# Patient Record
Sex: MALE | Race: WHITE | Employment: OTHER | URBAN - METROPOLITAN AREA
[De-identification: names, ages, dates, MRNs, and addresses within clinical notes are randomized per-mention and may not be internally consistent; named-entity substitution may affect disease eponyms.]

---

## 2017-02-15 RX ORDER — CARVEDILOL 25 MG/1
25 TABLET ORAL 2 TIMES DAILY WITH MEALS
Qty: 180 TABLET | Refills: 3 | Status: SHIPPED | OUTPATIENT
Start: 2017-02-15 | End: 2017-12-02 | Stop reason: SDUPTHER

## 2017-02-15 RX ORDER — RAMIPRIL 10 MG/1
10 CAPSULE ORAL DAILY
Qty: 90 CAPSULE | Refills: 3 | Status: SHIPPED | OUTPATIENT
Start: 2017-02-15 | End: 2017-12-02 | Stop reason: SDUPTHER

## 2017-02-15 RX ORDER — CLOPIDOGREL BISULFATE 75 MG/1
75 TABLET ORAL DAILY
Qty: 90 TABLET | Refills: 3 | Status: SHIPPED | OUTPATIENT
Start: 2017-02-15 | End: 2017-12-02 | Stop reason: SDUPTHER

## 2017-02-15 RX ORDER — RANOLAZINE 500 MG/1
500 TABLET, EXTENDED RELEASE ORAL 2 TIMES DAILY
Qty: 180 TABLET | Refills: 3 | Status: SHIPPED | OUTPATIENT
Start: 2017-02-15 | End: 2019-02-19 | Stop reason: SDUPTHER

## 2017-04-18 ENCOUNTER — TELEPHONE (OUTPATIENT)
Dept: CARDIOLOGY CLINIC | Age: 62
End: 2017-04-18

## 2017-11-07 ENCOUNTER — HOSPITAL ENCOUNTER (OUTPATIENT)
Dept: OTHER | Age: 62
Discharge: HOME | End: 2017-11-07
Attending: INTERNAL MEDICINE | Admitting: INTERNAL MEDICINE

## 2017-11-07 DIAGNOSIS — E78.5 HYPERLIPIDEMIA, UNSPECIFIED HYPERLIPIDEMIA TYPE: ICD-10-CM

## 2017-11-07 DIAGNOSIS — I25.10 CORONARY ARTERY DISEASE INVOLVING NATIVE CORONARY ARTERY OF NATIVE HEART WITHOUT ANGINA PECTORIS: ICD-10-CM

## 2017-11-07 DIAGNOSIS — I10 ESSENTIAL HYPERTENSION: ICD-10-CM

## 2017-11-07 DIAGNOSIS — I25.5 CARDIOMYOPATHY, ISCHEMIC: ICD-10-CM

## 2017-11-07 LAB
A/G RATIO: 1.6 (ref 1.1–2.2)
ALBUMIN SERPL-MCNC: 4 G/DL (ref 3.4–5)
ALP BLD-CCNC: 86 U/L (ref 40–129)
ALT SERPL-CCNC: 8 U/L (ref 10–40)
ANION GAP SERPL CALCULATED.3IONS-SCNC: 12 MMOL/L (ref 3–16)
AST SERPL-CCNC: 13 U/L (ref 15–37)
BILIRUB SERPL-MCNC: <0.2 MG/DL (ref 0–1)
BUN BLDV-MCNC: 15 MG/DL (ref 7–20)
CALCIUM SERPL-MCNC: 9.1 MG/DL (ref 8.3–10.6)
CHLORIDE BLD-SCNC: 103 MMOL/L (ref 99–110)
CHOLESTEROL, TOTAL: 151 MG/DL (ref 0–199)
CO2: 25 MMOL/L (ref 21–32)
CREAT SERPL-MCNC: 1 MG/DL (ref 0.8–1.3)
GFR AFRICAN AMERICAN: >60
GFR NON-AFRICAN AMERICAN: >60
GLOBULIN: 2.5 G/DL
GLUCOSE BLD-MCNC: 95 MG/DL (ref 70–99)
HDLC SERPL-MCNC: 39 MG/DL (ref 40–60)
LDL CHOLESTEROL CALCULATED: 93 MG/DL
POTASSIUM SERPL-SCNC: 4.4 MMOL/L (ref 3.5–5.1)
SODIUM BLD-SCNC: 140 MMOL/L (ref 136–145)
TOTAL PROTEIN: 6.5 G/DL (ref 6.4–8.2)
TRIGL SERPL-MCNC: 97 MG/DL (ref 0–150)
VLDLC SERPL CALC-MCNC: 19 MG/DL

## 2017-11-10 ENCOUNTER — TELEPHONE (OUTPATIENT)
Dept: CARDIOLOGY CLINIC | Age: 62
End: 2017-11-10

## 2017-11-28 NOTE — PROGRESS NOTES
Color Doppler Complete    CANCELED: ECHO 2D WO COLOR DOPPLER COMPLETE   2. Essential hypertension  nitroGLYCERIN (NITROSTAT) 0.4 MG SL tablet    CANCELED: ECHO 2D WO COLOR DOPPLER COMPLETE   3. Murmur  nitroGLYCERIN (NITROSTAT) 0.4 MG SL tablet    ECHO 2D WO Color Doppler Complete    CANCELED: ECHO 2D WO COLOR DOPPLER COMPLETE     Plan:  Mr Rose Smith appears stable from a cardiac standpoint. 1. No change in cardiac medical regimen. 2. 2D echo soon, has a murmur. 3. CMP,lipid panel before next visit. 4. Follow up in 1 year.      Thank you for allowing me to participate in the care of this individual.    Debo Scott M.D., [facility] [place]

## 2017-11-29 ENCOUNTER — OFFICE VISIT (OUTPATIENT)
Dept: CARDIOLOGY CLINIC | Age: 62
End: 2017-11-29

## 2017-11-29 VITALS
WEIGHT: 162 LBS | DIASTOLIC BLOOD PRESSURE: 68 MMHG | BODY MASS INDEX: 22.68 KG/M2 | HEART RATE: 68 BPM | SYSTOLIC BLOOD PRESSURE: 126 MMHG | HEIGHT: 71 IN

## 2017-11-29 DIAGNOSIS — I25.83 CORONARY ARTERY DISEASE DUE TO LIPID RICH PLAQUE: Primary | ICD-10-CM

## 2017-11-29 DIAGNOSIS — I10 ESSENTIAL HYPERTENSION: ICD-10-CM

## 2017-11-29 DIAGNOSIS — R01.1 MURMUR: ICD-10-CM

## 2017-11-29 DIAGNOSIS — I25.10 CORONARY ARTERY DISEASE DUE TO LIPID RICH PLAQUE: Primary | ICD-10-CM

## 2017-11-29 PROCEDURE — G8420 CALC BMI NORM PARAMETERS: HCPCS | Performed by: INTERNAL MEDICINE

## 2017-11-29 PROCEDURE — 3017F COLORECTAL CA SCREEN DOC REV: CPT | Performed by: INTERNAL MEDICINE

## 2017-11-29 PROCEDURE — G8427 DOCREV CUR MEDS BY ELIG CLIN: HCPCS | Performed by: INTERNAL MEDICINE

## 2017-11-29 PROCEDURE — G8484 FLU IMMUNIZE NO ADMIN: HCPCS | Performed by: INTERNAL MEDICINE

## 2017-11-29 PROCEDURE — G8598 ASA/ANTIPLAT THER USED: HCPCS | Performed by: INTERNAL MEDICINE

## 2017-11-29 PROCEDURE — 1036F TOBACCO NON-USER: CPT | Performed by: INTERNAL MEDICINE

## 2017-11-29 PROCEDURE — 99214 OFFICE O/P EST MOD 30 MIN: CPT | Performed by: INTERNAL MEDICINE

## 2017-11-29 RX ORDER — NITROGLYCERIN 0.4 MG/1
0.4 TABLET SUBLINGUAL EVERY 5 MIN PRN
Qty: 25 TABLET | Refills: 1 | Status: SHIPPED | OUTPATIENT
Start: 2017-11-29 | End: 2020-02-19 | Stop reason: SDUPTHER

## 2017-11-29 NOTE — PATIENT INSTRUCTIONS
Mr Surinder Garrett appears stable from a cardiac standpoint. 1. No change in cardiac medical regimen. 2. 2D echo soon, has a murmur. 3. CMP,lipid panel before next visit. 4. Follow up in 1 year.

## 2017-12-05 RX ORDER — RAMIPRIL 10 MG/1
CAPSULE ORAL
Qty: 90 CAPSULE | Refills: 3 | Status: SHIPPED | OUTPATIENT
Start: 2017-12-05 | End: 2019-02-19 | Stop reason: SDUPTHER

## 2017-12-05 RX ORDER — CLOPIDOGREL BISULFATE 75 MG/1
TABLET ORAL
Qty: 90 TABLET | Refills: 3 | Status: SHIPPED | OUTPATIENT
Start: 2017-12-05 | End: 2019-02-19 | Stop reason: SDUPTHER

## 2017-12-05 RX ORDER — CARVEDILOL 25 MG/1
TABLET ORAL
Qty: 180 TABLET | Refills: 3 | Status: SHIPPED | OUTPATIENT
Start: 2017-12-05 | End: 2019-02-19 | Stop reason: SDUPTHER

## 2017-12-11 ENCOUNTER — HOSPITAL ENCOUNTER (OUTPATIENT)
Dept: NON INVASIVE DIAGNOSTICS | Age: 62
Discharge: HOME | End: 2017-12-11
Attending: INTERNAL MEDICINE | Admitting: INTERNAL MEDICINE

## 2017-12-11 DIAGNOSIS — I25.10 ATHEROSCLEROTIC HEART DISEASE OF NATIVE CORONARY ARTERY WITHOUT ANGINA PECTORIS: ICD-10-CM

## 2017-12-11 LAB
LV EF: 55 %
LVEF MODALITY: NORMAL

## 2019-02-19 ENCOUNTER — OFFICE VISIT (OUTPATIENT)
Dept: CARDIOLOGY CLINIC | Age: 64
End: 2019-02-19
Payer: MEDICARE

## 2019-02-19 VITALS
BODY MASS INDEX: 23.8 KG/M2 | WEIGHT: 170 LBS | SYSTOLIC BLOOD PRESSURE: 130 MMHG | DIASTOLIC BLOOD PRESSURE: 80 MMHG | HEART RATE: 67 BPM | HEIGHT: 71 IN | OXYGEN SATURATION: 97 %

## 2019-02-19 DIAGNOSIS — R07.9 CHEST PAIN, UNSPECIFIED TYPE: Primary | ICD-10-CM

## 2019-02-19 DIAGNOSIS — I25.83 CORONARY ARTERY DISEASE DUE TO LIPID RICH PLAQUE: ICD-10-CM

## 2019-02-19 DIAGNOSIS — I25.5 CARDIOMYOPATHY, ISCHEMIC: Chronic | ICD-10-CM

## 2019-02-19 DIAGNOSIS — I10 ESSENTIAL HYPERTENSION: ICD-10-CM

## 2019-02-19 DIAGNOSIS — I25.10 CORONARY ARTERY DISEASE DUE TO LIPID RICH PLAQUE: ICD-10-CM

## 2019-02-19 PROCEDURE — 3017F COLORECTAL CA SCREEN DOC REV: CPT | Performed by: NURSE PRACTITIONER

## 2019-02-19 PROCEDURE — 1036F TOBACCO NON-USER: CPT | Performed by: NURSE PRACTITIONER

## 2019-02-19 PROCEDURE — 93000 ELECTROCARDIOGRAM COMPLETE: CPT | Performed by: NURSE PRACTITIONER

## 2019-02-19 PROCEDURE — G8484 FLU IMMUNIZE NO ADMIN: HCPCS | Performed by: NURSE PRACTITIONER

## 2019-02-19 PROCEDURE — 99214 OFFICE O/P EST MOD 30 MIN: CPT | Performed by: NURSE PRACTITIONER

## 2019-02-19 PROCEDURE — G8427 DOCREV CUR MEDS BY ELIG CLIN: HCPCS | Performed by: NURSE PRACTITIONER

## 2019-02-19 PROCEDURE — G8598 ASA/ANTIPLAT THER USED: HCPCS | Performed by: NURSE PRACTITIONER

## 2019-02-19 PROCEDURE — G8420 CALC BMI NORM PARAMETERS: HCPCS | Performed by: NURSE PRACTITIONER

## 2019-02-19 RX ORDER — CARVEDILOL 12.5 MG/1
12.5 TABLET ORAL 2 TIMES DAILY WITH MEALS
Qty: 180 TABLET | Refills: 2 | Status: SHIPPED | OUTPATIENT
Start: 2019-02-19 | End: 2020-02-19

## 2019-02-19 RX ORDER — CLOPIDOGREL BISULFATE 75 MG/1
TABLET ORAL
Qty: 90 TABLET | Refills: 2 | Status: SHIPPED | OUTPATIENT
Start: 2019-02-19 | End: 2020-02-19 | Stop reason: ALTCHOICE

## 2019-02-19 RX ORDER — RAMIPRIL 10 MG/1
CAPSULE ORAL
Qty: 90 CAPSULE | Refills: 2 | Status: SHIPPED | OUTPATIENT
Start: 2019-02-19 | End: 2020-02-19 | Stop reason: SDUPTHER

## 2019-02-19 RX ORDER — CARVEDILOL 12.5 MG/1
12.5 TABLET ORAL 2 TIMES DAILY WITH MEALS
COMMUNITY
End: 2019-02-19

## 2019-02-19 RX ORDER — CARVEDILOL 25 MG/1
TABLET ORAL
Qty: 180 TABLET | Refills: 2 | Status: SHIPPED | OUTPATIENT
Start: 2019-02-19 | End: 2019-02-21 | Stop reason: DRUGHIGH

## 2019-02-19 RX ORDER — ISOSORBIDE MONONITRATE 30 MG/1
TABLET, EXTENDED RELEASE ORAL
Qty: 90 TABLET | Refills: 2 | Status: SHIPPED | OUTPATIENT
Start: 2019-02-19 | End: 2020-02-19 | Stop reason: ALTCHOICE

## 2019-02-19 RX ORDER — RANOLAZINE 500 MG/1
500 TABLET, EXTENDED RELEASE ORAL 2 TIMES DAILY
Qty: 180 TABLET | Refills: 2 | Status: SHIPPED | OUTPATIENT
Start: 2019-02-19 | End: 2020-02-19 | Stop reason: ALTCHOICE

## 2019-02-19 RX ORDER — ATORVASTATIN CALCIUM 40 MG/1
40 TABLET, FILM COATED ORAL NIGHTLY
Qty: 90 TABLET | Refills: 2 | Status: SHIPPED | OUTPATIENT
Start: 2019-02-19 | End: 2020-02-19 | Stop reason: DRUGHIGH

## 2019-02-21 RX ORDER — CARVEDILOL 25 MG/1
TABLET ORAL
Qty: 180 TABLET | Refills: 2 | Status: SHIPPED | OUTPATIENT
Start: 2019-02-21 | End: 2020-02-19 | Stop reason: SDUPTHER

## 2019-02-22 ENCOUNTER — HOSPITAL ENCOUNTER (OUTPATIENT)
Dept: NON INVASIVE DIAGNOSTICS | Age: 64
Discharge: HOME | End: 2019-02-22
Payer: MEDICARE

## 2019-02-22 LAB
LV EF: 66 %
LVEF MODALITY: NORMAL

## 2019-02-22 PROCEDURE — A9502 TC99M TETROFOSMIN: HCPCS | Performed by: NURSE PRACTITIONER

## 2019-02-22 PROCEDURE — 93017 CV STRESS TEST TRACING ONLY: CPT | Performed by: INTERNAL MEDICINE

## 2019-02-22 PROCEDURE — 3430000000 HC RX DIAGNOSTIC RADIOPHARMACEUTICAL: Performed by: NURSE PRACTITIONER

## 2019-02-22 PROCEDURE — 78452 HT MUSCLE IMAGE SPECT MULT: CPT

## 2019-02-22 RX ADMIN — TETROFOSMIN 10 MILLICURIE: 0.23 INJECTION, POWDER, LYOPHILIZED, FOR SOLUTION INTRAVENOUS at 08:49

## 2019-02-22 RX ADMIN — TETROFOSMIN 30 MILLICURIE: 0.23 INJECTION, POWDER, LYOPHILIZED, FOR SOLUTION INTRAVENOUS at 10:51

## 2019-10-03 ENCOUNTER — TELEPHONE (OUTPATIENT)
Dept: CARDIOLOGY CLINIC | Age: 64
End: 2019-10-03

## 2019-11-25 ENCOUNTER — TELEPHONE (OUTPATIENT)
Dept: CARDIOLOGY CLINIC | Age: 64
End: 2019-11-25

## 2020-01-20 ENCOUNTER — TELEPHONE (OUTPATIENT)
Dept: CARDIOLOGY CLINIC | Age: 65
End: 2020-01-20

## 2020-01-20 NOTE — TELEPHONE ENCOUNTER
Called pt [facility] for 02/19/2020 9:15am with [facility].  Thank you

## 2020-01-20 NOTE — TELEPHONE ENCOUNTER
----- Message from Vito Sandoval RN sent at 1/20/2020  7:51 AM EST -----  Received form from dentist asking to hold Plavix. Patient was last seen by KENNETH Butler NP on 2/19/19. Prior to that, he was seen by Dr. Mireille Loomis 11/2017. He is not on the recall list.     Please call and schedule patient for MD or NP appt as he is nearing one year. His procedure is scheduled for 2/27/2020.

## 2020-01-29 ENCOUNTER — TELEPHONE (OUTPATIENT)
Dept: CARDIOLOGY CLINIC | Age: 65
End: 2020-01-29

## 2020-01-29 NOTE — TELEPHONE ENCOUNTER
Spoke to Jamin Rivas and discussed recommendations. Dr. Cano He completed extraction form giving okay to hold Plavix up to 7 days prior to extraction (can be shorter duration per dentist), but should resume ASAP when safe from the bleeding perspective after extraction.       Form faxed successfully to to dentist office

## 2020-02-16 NOTE — PROGRESS NOTES
minutes as needed for Chest pain Yes Curtis Arnold MD   aspirin 81 MG tablet Take 81 mg by mouth daily. Yes Historical Provider, MD   sertraline (ZOLOFT) 50 MG tablet Take 50 mg by mouth daily. Yes Historical Provider, MD        Allergies   Allergen Reactions    Azithromycin Hives    Erythromycin Hives    Penicillins Hives    Vancomycin Hives    Tetanus Toxoids     Prednisone Rash       Past Medical History:   Diagnosis Date    Acute MI ([address] 1/10/11    V Fib cardiac arrest; stent to LAD    CAD (coronary artery disease)     Hypercholesteremia     Hypertension     PCI (pneumatosis cystoides intestinalis)     Psychiatric problem        Past Surgical History:   Procedure Laterality Date    CARDIAC SURGERY      CATARACT REMOVAL      DIAGNOSTIC CARDIAC CATH LAB PROCEDURE      ELBOW SURGERY      EYE SURGERY      KNEE ARTHROSCOPY Right 2012    Replacement pending    KNEE SURGERY      SHOULDER ARTHROPLASTY Right 5/15/15    SHOULDER SURGERY      SHOULDER SURGERY Right 9/2014    x2    SHOULDER SURGERY Right 2017    Repaired and shoulder joint    SINUS SURGERY      TONSILLECTOMY         Social History     Tobacco Use    Smoking status: Former Smoker     Packs/day: 0.50     Years: 30.00     Pack years: 15.00     Types: Cigarettes     Last attempt to quit: 2011     Years since quittin.1    Smokeless tobacco: Never Used    Tobacco comment: H.O.smoking 0.5 p.p.d x 30 yrs / Quit 2011   Substance Use Topics    Alcohol use: No     Comment: rarely        Family History   Problem Relation Age of Onset    Heart Attack Mother        PHYSICAL EXAMINATION:  Vitals:    20 0911   BP: 124/68   Site: Right Upper Arm   Position: Sitting   Cuff Size: Medium Adult   Pulse: 63   Resp: 18   SpO2: 95%   Weight: 156 lb (70.8 kg)   Height: 5' 10\" (1.778 m)     Estimated body mass index is 22.38 kg/m² as calculated from the following:    Height as of this encounter: 5' 10\" (1.778 m).

## 2020-02-16 NOTE — PATIENT INSTRUCTIONS
1.  Stop Plavix  2. Labs per PCP - will call PCP to get a copy of lab results  3. Call office with any concerning symptoms  4.   Echo and office visit in one year

## 2020-02-19 ENCOUNTER — OFFICE VISIT (OUTPATIENT)
Dept: CARDIOLOGY CLINIC | Age: 65
End: 2020-02-19
Payer: MEDICARE

## 2020-02-19 VITALS
WEIGHT: 156 LBS | HEIGHT: 70 IN | SYSTOLIC BLOOD PRESSURE: 124 MMHG | DIASTOLIC BLOOD PRESSURE: 68 MMHG | BODY MASS INDEX: 22.33 KG/M2 | HEART RATE: 63 BPM | OXYGEN SATURATION: 95 % | RESPIRATION RATE: 18 BRPM

## 2020-02-19 PROBLEM — R01.1 MURMUR: Status: ACTIVE | Noted: 2020-02-19

## 2020-02-19 PROCEDURE — 3017F COLORECTAL CA SCREEN DOC REV: CPT | Performed by: INTERNAL MEDICINE

## 2020-02-19 PROCEDURE — G8484 FLU IMMUNIZE NO ADMIN: HCPCS | Performed by: INTERNAL MEDICINE

## 2020-02-19 PROCEDURE — 99213 OFFICE O/P EST LOW 20 MIN: CPT | Performed by: INTERNAL MEDICINE

## 2020-02-19 PROCEDURE — G8420 CALC BMI NORM PARAMETERS: HCPCS | Performed by: INTERNAL MEDICINE

## 2020-02-19 PROCEDURE — 1036F TOBACCO NON-USER: CPT | Performed by: INTERNAL MEDICINE

## 2020-02-19 PROCEDURE — G8427 DOCREV CUR MEDS BY ELIG CLIN: HCPCS | Performed by: INTERNAL MEDICINE

## 2020-02-19 RX ORDER — RAMIPRIL 10 MG/1
CAPSULE ORAL
Qty: 90 CAPSULE | Refills: 2 | Status: SHIPPED | OUTPATIENT
Start: 2020-02-19 | End: 2021-03-12 | Stop reason: SDUPTHER

## 2020-02-19 RX ORDER — ATORVASTATIN CALCIUM 20 MG/1
20 TABLET, FILM COATED ORAL DAILY
COMMUNITY
End: 2022-03-02

## 2020-02-19 RX ORDER — NITROGLYCERIN 0.4 MG/1
0.4 TABLET SUBLINGUAL EVERY 5 MIN PRN
Qty: 25 TABLET | Refills: 1 | Status: SHIPPED | OUTPATIENT
Start: 2020-02-19

## 2020-02-19 RX ORDER — CARVEDILOL 25 MG/1
TABLET ORAL
Qty: 180 TABLET | Refills: 2 | Status: SHIPPED | OUTPATIENT
Start: 2020-02-19 | End: 2021-03-12 | Stop reason: SDUPTHER

## 2020-02-19 NOTE — LETTER
[address] [facility] [address] [address]  Phone: [phone]  Fax: [phone]    Sonia Gomez MD        2020     [facility] [place] [address]    Patient: Lillie Black  MR Number: [id number]  YOB: 1955  Date of Visit: 2020    Dear  [facility]:    Below are the relevant portions of my assessment and plan of care. [address]    2020    Lillie Black (:  1955) is a 62 y.o. male who is here for annual follow up for the management of coronary artery disease and modifiable risk factors. Referring Provider: Geena DONAHUE    HISTORY:  Mr. Karen Aparicio has a history of coronary artery disease, hypertension, hyperlipidemia, and ischemic cardiomyopathy with recovered LVEF. He presented to the ER in 2011 with acute anterolateral MI which was complicated by recurrent ventricular fibrillation and CHF with cardiogenic shock. LHC was done emergently and occluded prox LAD was revascularized with BRANDI, LVEF was 20%. Repeat LHC in 2011 showed patent LAD stent and normal LVEF. He reported intermittent chest pressure at office visit .  19 Myoview GXT showed normal perfusion, normal LVEF. Today he is doing well from the cardiac standpoint. He denies exertional chest pain/pressure, shortness of breath, dizziness, palpitations, or edema. He no longer takes Ranexa or Imdur. He had right knee surgery in late  and is still bothered by nerve pain. He remains on Plavix and will have a dental implant very soon. Patient is compliant with medications and is tolerating them well without side effects. REVIEW OF SYSTEMS:  A complete review of systems was reviewed and is negative except as noted in the history of present illness. Prior to Visit Medications    Medication Sig Taking?  Authorizing Provider LABGLOM >60 11/07/2017    GLUCOSE 95 11/07/2017    PROT 6.5 11/07/2017    CALCIUM 9.1 11/07/2017    BILITOT <0.2 11/07/2017    ALKPHOS 86 11/07/2017    AST 13 11/07/2017    ALT 8 11/07/2017     LIPIDS: No components found for: TOTAL CHOLESTEROL,  HDL,  LDL,  TRIGLYCERIDES  PT/INR: No results found for: PTINR       Myoview GXT 2/22/19:     No EKG evidence for ischemia with exercise       Normal LV size and systolic function.       There is normal isotope uptake at stress and rest. There is no evidence of    myocardial ischemia or scar. Echo 12/11/17:  Summary   Normal left ventricle size, wall thickness and systolic function with an   EF 55%. Mild mitral regurgitation is present. The aortic valve appears sclerotic but opens well. Mild tricuspid regurgitation with RVSP estimated at 31 mmHg. Myoview GXT 12/20/12   Summary    Small sized apical fixed defect consistent with infarction in the territory    of the distal LAD . Cardiac cath 10/2011  Patent LAD stent  No significant CAD except for 90% ostial diagonal jailed by stent, small caliber  LVEF 55%    Cardiac cath 1/9/11  Occluded proximal LAD - revascularized with BRANDI  Nonobstructive disease of Rt PDA  LVEF 20%     ASSESSMENT/PLAN:    Coronary artery disease  ~ 1/2011 s/p STEMI (anterolateral MI) with ventricular fibrillation s/p defibrillation. [facility] - Occluded LAD revascularized with BRANDI. LVEF 20%  ~ 10/2011 [facility] - patent LAD stent, LVEF 55%  ~ 2/2012 ECHO --  EF 55%  ~ 12/2012 and 2/22/19 Myoview GXT -- normal perfusion, normal EF  ~ tx with ASA, Plavix, Coreg, statin. No longer on Ranexa or Imdur. Tolerating medications without side effects. No bleeding or unusual bruising   ~ no exertional chest pain or shortness of breath    Plan > continue current medication, monitor for anginal symptoms, risk factor management    Hypertension :  ~ tx with Coreg, Lisinopril.   Tolerating medications without side effects

## 2020-12-14 ENCOUNTER — TELEPHONE (OUTPATIENT)
Dept: CARDIOLOGY CLINIC | Age: 65
End: 2020-12-14

## 2020-12-15 ENCOUNTER — TELEPHONE (OUTPATIENT)
Dept: CARDIOLOGY CLINIC | Age: 65
End: 2020-12-15

## 2020-12-15 NOTE — TELEPHONE ENCOUNTER
Patient called us back. Gave message listed below to patient. He reported no cardiac issues. No palpitations, chest pain or discomfort. No issues were reported and patient said he feels great.

## 2021-03-01 DIAGNOSIS — R01.1 CARDIAC MURMUR: Primary | ICD-10-CM

## 2021-03-01 DIAGNOSIS — I35.8 AORTIC VALVE SCLEROSIS: ICD-10-CM

## 2021-03-06 NOTE — PROGRESS NOTES
[facility]    3/12/2021    June Kaplan (:  1955) is a 60 y.o. male who is here for management of CAD and modifiable risk factors. Referring Provider: Jennifer Thrasher    HISTORY:  Mr. Consepcion Epley has a history of coronary artery disease, hypertension, hyperlipidemia, and ischemic cardiomyopathy with recovered LVEF. He presented to the ER in 2011 with acute anterolateral MI which was complicated by recurrent ventricular fibrillation and CHF with cardiogenic shock. LHC was done emergently and occluded prox LAD was revascularized with BRANDI, LVEF was 20%. Repeat LHC in 2011 showed patent LAD stent and normal LVEF. He reported intermittent chest pressure at office visit .  19 Myoview GXT showed normal perfusion, normal LVEF. Today, he denies chest discomfort, shortness of breath, lightheadedness, dizziness or palpitations. REVIEW OF SYSTEMS:  A complete review of systems was reviewed and is negative except as noted in the history of present illness. Prior to Visit Medications    Medication Sig Taking? Authorizing Provider   DULoxetine (CYMBALTA) 30 MG extended release capsule Take 30 mg by mouth daily Yes Historical Provider, MD   carvedilol (COREG) 25 MG tablet TAKE 1 TABLET TWICE DAILY  WITH MEALS Yes Trip Fernandes MD   ramipril (ALTACE) 10 MG capsule TAKE 1 CAPSULE DAILY Yes Trip Fernandes MD   nitroGLYCERIN (NITROSTAT) 0.4 MG SL tablet Place 1 tablet under the tongue every 5 minutes as needed for Chest pain Yes Trip Fernandes MD   atorvastatin (LIPITOR) 20 MG tablet Take 20 mg by mouth daily Yes Historical Provider, MD   aspirin 81 MG tablet Take 81 mg by mouth daily.  Yes Historical Provider, MD        Allergies   Allergen Reactions    Azithromycin Hives    Erythromycin Hives    Penicillins Hives    Vancomycin Hives    Tetanus Toxoids     Prednisone Rash       Past Medical History:   Diagnosis Date    Acute MI [address] [address] 1/10/11    V Fib Musculoskeletal: Normal range of motion and neck supple. Vascular: No JVD. Cardiovascular:      Rate and Rhythm: Normal rate and regular rhythm. Heart sounds: Murmur present. No friction rub. No gallop. Comments: 1/6 systolic murmur LLSB  Pulmonary:      Effort: Pulmonary effort is normal. No respiratory distress. Breath sounds: Normal breath sounds. No wheezing or rales. Abdominal:      General: Bowel sounds are normal.      Palpations: Abdomen is soft. Tenderness: There is no abdominal tenderness. Musculoskeletal: Normal range of motion. Skin:     General: Skin is warm and dry. Findings: No rash. Neurological:      General: No focal deficit present. Mental Status: He is alert and oriented to person, place, and time. Psychiatric:         Mood and Affect: Mood normal.         Behavior: Behavior normal.         Thought Content: Thought content normal.         Judgment: Judgment normal.           I have reviewed all pertinent lab results and diagnostic testing.         LABS:  CBC:   Lab Results   Component Value Date    WBC 7.4 10/04/2013    RBC 4.03 10/04/2013    HGB 12.8 10/04/2013    HCT 37.3 10/04/2013    MCV 92.5 10/04/2013    RDW 14.2 10/04/2013     10/04/2013     CMP:   Lab Results   Component Value Date     11/07/2017    K 4.4 11/07/2017     11/07/2017    CO2 25 11/07/2017    BUN 15 11/07/2017    CREATININE 1.0 11/07/2017    GFRAA >60 11/07/2017    GFRAA >60 06/20/2012    AGRATIO 1.6 11/07/2017    LABGLOM >60 11/07/2017    GLUCOSE 95 11/07/2017    PROT 6.5 11/07/2017    CALCIUM 9.1 11/07/2017    BILITOT <0.2 11/07/2017    ALKPHOS 86 11/07/2017    AST 13 11/07/2017    ALT 8 11/07/2017     Lab Results   Component Value Date    CHOL 151 11/07/2017    TRIG 97 11/07/2017    HDL 39 11/07/2017    HDL 38 10/10/2011    LDLCALC 93 11/07/2017     Echo 3/12/21:  Summary   Normal left ventricle size, wall thickness and systolic function with an   estimated dialy    Plan: He has had some inconsistency in taking his Lipitor and also has had some dietary indiscretion and not as active is previous. Plan to have him take his statin consistently. Modify diet. Increase activity. Recheck fasting lipid profile in approximately 2 months. He states that his primary care physician is due to have him check it around May. Cardiac murmur  - Echo in 12/2017 showed mild MR and sclerotic aortic valve  - 3/8/21 Echo --normal B systolic function, mild mitral regurgitation    Plan: Mild mitral regurgitation. 2D echo with Doppler overall unremarkable. Plan:  1. Continue Lipitor 20 mg nightly  2. Increase activity and dietary modification. 3.  Fasting liver profile around May 2021. 4.  Monitor blood pressure. 5.  RTC in 1 year. Scribe's attestation: This note was scribed in the presence of Samantha Antunez M.D. by Huan Andino RN    Physician Attestation: The scribe's documentation has been prepared under my direction and personally reviewed by me in its entirety. I confirm that the note above accurately reflects all work, treatment, procedures, and medical decision making performed by me. An  electronic signature was used to authenticate this note. Jose E Sharp MD, [facility] [place], [address]

## 2021-03-12 ENCOUNTER — OFFICE VISIT (OUTPATIENT)
Dept: CARDIOLOGY CLINIC | Age: 66
End: 2021-03-12
Payer: MEDICARE

## 2021-03-12 ENCOUNTER — HOSPITAL ENCOUNTER (OUTPATIENT)
Dept: NON INVASIVE DIAGNOSTICS | Age: 66
Discharge: HOME | End: 2021-03-12
Payer: MEDICARE

## 2021-03-12 VITALS
OXYGEN SATURATION: 97 % | HEIGHT: 71 IN | DIASTOLIC BLOOD PRESSURE: 80 MMHG | SYSTOLIC BLOOD PRESSURE: 148 MMHG | WEIGHT: 162 LBS | HEART RATE: 67 BPM | BODY MASS INDEX: 22.68 KG/M2

## 2021-03-12 DIAGNOSIS — I25.10 CORONARY ARTERY DISEASE DUE TO LIPID RICH PLAQUE: Primary | ICD-10-CM

## 2021-03-12 DIAGNOSIS — R01.1 MURMUR: ICD-10-CM

## 2021-03-12 DIAGNOSIS — I35.8 AORTIC VALVE SCLEROSIS: ICD-10-CM

## 2021-03-12 DIAGNOSIS — E78.5 HYPERLIPIDEMIA, UNSPECIFIED HYPERLIPIDEMIA TYPE: ICD-10-CM

## 2021-03-12 DIAGNOSIS — R01.1 CARDIAC MURMUR: ICD-10-CM

## 2021-03-12 DIAGNOSIS — I25.83 CORONARY ARTERY DISEASE DUE TO LIPID RICH PLAQUE: Primary | ICD-10-CM

## 2021-03-12 DIAGNOSIS — I10 ESSENTIAL HYPERTENSION: ICD-10-CM

## 2021-03-12 LAB
LV EF: 55 %
LVEF MODALITY: NORMAL

## 2021-03-12 PROCEDURE — G8420 CALC BMI NORM PARAMETERS: HCPCS | Performed by: INTERNAL MEDICINE

## 2021-03-12 PROCEDURE — G8484 FLU IMMUNIZE NO ADMIN: HCPCS | Performed by: INTERNAL MEDICINE

## 2021-03-12 PROCEDURE — 99214 OFFICE O/P EST MOD 30 MIN: CPT | Performed by: INTERNAL MEDICINE

## 2021-03-12 PROCEDURE — 3017F COLORECTAL CA SCREEN DOC REV: CPT | Performed by: INTERNAL MEDICINE

## 2021-03-12 PROCEDURE — 1036F TOBACCO NON-USER: CPT | Performed by: INTERNAL MEDICINE

## 2021-03-12 PROCEDURE — 93306 TTE W/DOPPLER COMPLETE: CPT

## 2021-03-12 PROCEDURE — G8427 DOCREV CUR MEDS BY ELIG CLIN: HCPCS | Performed by: INTERNAL MEDICINE

## 2021-03-12 RX ORDER — DULOXETIN HYDROCHLORIDE 30 MG/1
30 CAPSULE, DELAYED RELEASE ORAL DAILY
COMMUNITY
Start: 2021-03-11 | End: 2022-03-31

## 2021-03-12 RX ORDER — CARVEDILOL 25 MG/1
TABLET ORAL
Qty: 180 TABLET | Refills: 3 | Status: SHIPPED | OUTPATIENT
Start: 2021-03-12 | End: 2022-03-02 | Stop reason: ALTCHOICE

## 2021-03-12 RX ORDER — RAMIPRIL 10 MG/1
CAPSULE ORAL
Qty: 90 CAPSULE | Refills: 3 | Status: SHIPPED | OUTPATIENT
Start: 2021-03-12

## 2021-03-12 NOTE — LETTER
calculated from the following:    Height as of this encounter: 5' 11\" (1.803 m). Weight as of this encounter: 162 lb (73.5 kg). Physical Exam  Constitutional:       Appearance: He is well-developed. He is not diaphoretic. HENT:      Head: Normocephalic and atraumatic. Eyes:      General: No scleral icterus. Extraocular Movements: Extraocular movements intact. Conjunctiva/sclera: Conjunctivae normal.   Neck:      Musculoskeletal: Normal range of motion and neck supple. Vascular: No JVD. Cardiovascular:      Rate and Rhythm: Normal rate and regular rhythm. Heart sounds: Murmur present. No friction rub. No gallop. Comments: 1/6 systolic murmur LLSB  Pulmonary:      Effort: Pulmonary effort is normal. No respiratory distress. Breath sounds: Normal breath sounds. No wheezing or rales. Abdominal:      General: Bowel sounds are normal.      Palpations: Abdomen is soft. Tenderness: There is no abdominal tenderness. Musculoskeletal: Normal range of motion. Skin:     General: Skin is warm and dry. Findings: No rash. Neurological:      General: No focal deficit present. Mental Status: He is alert and oriented to person, place, and time. Psychiatric:         Mood and Affect: Mood normal.         Behavior: Behavior normal.         Thought Content: Thought content normal.         Judgment: Judgment normal.           I have reviewed all pertinent lab results and diagnostic testing.         LABS:  CBC:   Lab Results   Component Value Date    WBC 7.4 10/04/2013    RBC 4.03 10/04/2013    HGB 12.8 10/04/2013    HCT 37.3 10/04/2013    MCV 92.5 10/04/2013    RDW 14.2 10/04/2013     10/04/2013     CMP:   Lab Results   Component Value Date     11/07/2017    K 4.4 11/07/2017     11/07/2017    CO2 25 11/07/2017    BUN 15 11/07/2017    CREATININE 1.0 11/07/2017    GFRAA >60 11/07/2017    GFRAA >60 06/20/2012    AGRATIO 1.6 11/07/2017    LABGLOM >60 11/07/2017    GLUCOSE 95 11/07/2017    PROT 6.5 11/07/2017    CALCIUM 9.1 11/07/2017    BILITOT <0.2 11/07/2017    ALKPHOS 86 11/07/2017    AST 13 11/07/2017    ALT 8 11/07/2017     Lab Results   Component Value Date    CHOL 151 11/07/2017    TRIG 97 11/07/2017    HDL 39 11/07/2017    HDL 38 10/10/2011    LDLCALC 93 11/07/2017     Echo 3/12/21:  Summary   Normal left ventricle size, wall thickness and systolic function with an   estimated ejection fraction of 55%. No regional wall motion abnormalities   are seen. Normal diastolic function. Mild mitral regurgitation. Myoview GXT 2/22/19:     No EKG evidence for ischemia with exercise       Normal LV size and systolic function.       There is normal isotope uptake at stress and rest. There is no evidence of    myocardial ischemia or scar. Echo 12/11/17:  Summary   Normal left ventricle size, wall thickness and systolic function with an   EF 55%. Mild mitral regurgitation is present. The aortic valve appears sclerotic but opens well. Mild tricuspid regurgitation with RVSP estimated at 31 mmHg. Myoview GXT 12/20/12   Summary    Small sized apical fixed defect consistent with infarction in the territory    of the distal LAD . Cardiac cath 10/2011  Patent LAD stent  No significant CAD except for 90% ostial diagonal jailed by stent, small caliber  LVEF 55%    Cardiac cath 1/9/11  Occluded proximal LAD - revascularized with BRANDI  Nonobstructive disease of Rt PDA  LVEF 20%     ASSESSMENT/PLAN:    Coronary artery disease  - 1/2011 s/p STEMI (anterolateral MI) with ventricular fibrillation s/p defibrillation. [facility] - Occluded LAD revascularized with BRANDI. LVEF 20%  - 10/2011 C - patent LAD stent, LVEF 55%  -  2/2012 ECHO --  EF 55%  -  12/2012 and 2/22/19 Myoview GXT -- normal perfusion, normal EF  - 3/8/20 Echo --   -  tx with ASA,  Coreg, Ramipril, statin. Plavix stopped 2/2020. No longer on Ranexa or Imdur. Plan: Stable.

## 2021-03-12 NOTE — PATIENT INSTRUCTIONS
1.  LDL was elevated at 122 in January 2021. Want the LDL under 100  2. Attempt dietary modification - eating more whole foods (fruits, vegetables, lean protein, whole grains etc). Make sure you are taking Atorvastatin regularly, and increase activity. 3.  Recheck fasting lipids and liver enzymes by PCP in the next two months (make sure he is repeating lipids)  4. Call our office after blood work is drawn so we can review lab results from [place]. If LDL is still above 100, will likely recommend increasing Atorvastatin to 40 mg  5. Start checking blood pressure at home. Goal SBP (top #) is under 140 but prefer under 130. Goal DBP (bottom #) under 90 but prefer under 80  6.   Follow up in one year

## 2021-03-17 ENCOUNTER — TELEPHONE (OUTPATIENT)
Dept: CARDIOLOGY CLINIC | Age: 66
End: 2021-03-17

## 2021-06-02 ENCOUNTER — CLINICAL DOCUMENTATION (OUTPATIENT)
Dept: OTHER | Age: 66
End: 2021-06-02

## 2021-12-15 ENCOUNTER — CLINICAL DOCUMENTATION (OUTPATIENT)
Dept: OTHER | Age: 66
End: 2021-12-15

## 2022-03-01 NOTE — PROGRESS NOTES
[address]     3/2/2022    Love Wright (:  1955) is a 73 y.o. male is here for follow-up of management of CAD and modifiable risk factors. Referring Provider: Valencia Schwab    HISTORY:  Mr. Aye Jeffries has a history of coronary artery disease, hypertension, hyperlipidemia, and ischemic cardiomyopathy with recovered LVEF. Additional history includes CKD. He presented to the ER in 2011 with acute anterolateral MI which was complicated by recurrent ventricular fibrillation and CHF with cardiogenic shock. LHC was done emergently and occluded prox LAD was revascularized with BRANDI, LVEF was 20%. Repeat LHC in 2011 showed patent LAD stent and normal LVEF. He reported intermittent chest pressure at office visit .  19 Myoview GXT showed normal perfusion, normal LVEF. Today, he denies significant chest discomfort, shortness of breath, light headedness, dizziness or palpitations. He states that he has trouble remembering to take his carvedilol in the morning as he takes all of his other medications in the evening. He also had a change in his statin to Crestor which has caused resolution of his leg discomfort. REVIEW OF SYSTEMS:  A complete review of systems was reviewed and is negative except as noted in the history of present illness. Prior to Visit Medications    Medication Sig Taking?  Authorizing Provider   metoprolol succinate (TOPROL XL) 100 MG extended release tablet Take 1 tablet by mouth daily Yes Dasha Roque MD   rosuvastatin (CRESTOR) 40 MG tablet Take 1 tablet by mouth daily Yes Dasha Roque MD   DULoxetine (CYMBALTA) 30 MG extended release capsule Take 30 mg by mouth daily Yes Historical Provider, MD   ramipril (ALTACE) 10 MG capsule TAKE 1 CAPSULE DAILY Yes Dasha Roque MD   nitroGLYCERIN (NITROSTAT) 0.4 MG SL tablet Place 1 tablet under the tongue every 5 minutes as needed for Chest pain Yes Dasha Roque MD   aspirin 81 MG tablet Take 81 mg by mouth daily. Yes Historical Provider, MD        Allergies   Allergen Reactions    Azithromycin Hives    Erythromycin Hives    Penicillins Hives    Vancomycin Hives    Tetanus Toxoids     Prednisone Rash       Past Medical History:   Diagnosis Date    Acute MI ([address] 1/10/11    V Fib cardiac arrest; stent to LAD    CAD (coronary artery disease)     Hypercholesteremia     Hypertension     PCI (pneumatosis cystoides intestinalis)     Psychiatric problem        Past Surgical History:   Procedure Laterality Date    CARDIAC SURGERY      CATARACT REMOVAL      DIAGNOSTIC CARDIAC CATH LAB PROCEDURE      ELBOW SURGERY      EYE SURGERY      KNEE ARTHROSCOPY Right 2012    Replacement pending    KNEE SURGERY      SHOULDER ARTHROPLASTY Right 5/15/15    SHOULDER SURGERY      SHOULDER SURGERY Right 9/2014    x2    SHOULDER SURGERY Right 2017    Repaired and shoulder joint    SINUS SURGERY      TONSILLECTOMY         Social History     Tobacco Use    Smoking status: Former Smoker     Packs/day: 0.50     Years: 30.00     Pack years: 15.00     Types: Cigarettes     Quit date: 2011     Years since quittin.1    Smokeless tobacco: Never Used    Tobacco comment: H.O.smoking 0.5 p.p.d x 30 yrs / Quit 2011   Substance Use Topics    Alcohol use: No     Comment: rarely        Family History   Problem Relation Age of Onset    Heart Attack Mother        PHYSICAL EXAMINATION:  Vitals:    22 1116   BP: (!) 144/90   Pulse: 87   SpO2: 97%   Weight: 161 lb 11.2 oz (73.3 kg)   Height: 5' 11\" (1.803 m)     Estimated body mass index is 22.55 kg/m² as calculated from the following:    Height as of this encounter: 5' 11\" (1.803 m). Weight as of this encounter: 161 lb 11.2 oz (73.3 kg). Physical Exam  Constitutional:       Appearance: He is well-developed. He is not diaphoretic. HENT:      Head: Normocephalic and atraumatic. Eyes:      General: No scleral icterus. Extraocular Movements: Extraocular movements intact. Conjunctiva/sclera: Conjunctivae normal.   Neck:      Vascular: No JVD. Cardiovascular:      Rate and Rhythm: Normal rate and regular rhythm. Heart sounds: Murmur heard. No friction rub. No gallop. Comments: 1/6 systolic murmur LLSB  Pulmonary:      Effort: Pulmonary effort is normal. No respiratory distress. Breath sounds: Normal breath sounds. No wheezing or rales. Abdominal:      General: Bowel sounds are normal.      Palpations: Abdomen is soft. Tenderness: There is no abdominal tenderness. Musculoskeletal:         General: Normal range of motion. Cervical back: Normal range of motion and neck supple. Skin:     General: Skin is warm and dry. Findings: No rash. Neurological:      General: No focal deficit present. Mental Status: He is alert and oriented to person, place, and time. Psychiatric:         Mood and Affect: Mood normal.         Behavior: Behavior normal.         Thought Content: Thought content normal.         Judgment: Judgment normal.           I have reviewed all pertinent lab results and diagnostic testing.         LABS:  CBC:   Lab Results   Component Value Date    WBC 7.4 10/04/2013    RBC 4.03 10/04/2013    HGB 12.8 10/04/2013    HCT 37.3 10/04/2013    MCV 92.5 10/04/2013    RDW 14.2 10/04/2013     10/04/2013     CMP:   Lab Results   Component Value Date     11/07/2017    K 4.4 11/07/2017     11/07/2017    CO2 25 11/07/2017    BUN 15 11/07/2017    CREATININE 1.0 11/07/2017    GFRAA >60 11/07/2017    GFRAA >60 06/20/2012    AGRATIO 1.6 11/07/2017    LABGLOM >60 11/07/2017    GLUCOSE 95 11/07/2017    PROT 6.5 11/07/2017    CALCIUM 9.1 11/07/2017    BILITOT <0.2 11/07/2017    ALKPHOS 86 11/07/2017    AST 13 11/07/2017    ALT 8 11/07/2017     Lab Results   Component Value Date    CHOL 151 11/07/2017    TRIG 97 11/07/2017    HDL 39 11/07/2017    HDL 38 10/10/2011    LDLCALC 93 11/07/2017     Echo 3/12/21:  Summary   Normal left ventricle size, wall thickness and systolic function with an   estimated ejection fraction of 55%. No regional wall motion abnormalities   are seen. Normal diastolic function. Mild mitral regurgitation. Myoview GXT 2/22/19:     No EKG evidence for ischemia with exercise       Normal LV size and systolic function.       There is normal isotope uptake at stress and rest. There is no evidence of    myocardial ischemia or scar. Echo 12/11/17:  Summary   Normal left ventricle size, wall thickness and systolic function with an   EF 55%. Mild mitral regurgitation is present. The aortic valve appears sclerotic but opens well. Mild tricuspid regurgitation with RVSP estimated at 31 mmHg. Myoview GXT 12/20/12   Summary    Small sized apical fixed defect consistent with infarction in the territory    of the distal LAD . Cardiac cath 10/2011  Patent LAD stent  No significant CAD except for 90% ostial diagonal jailed by stent, small caliber  LVEF 55%    Cardiac cath 1/9/11  Occluded proximal LAD - revascularized with BRANDI  Nonobstructive disease of Rt PDA  LVEF 20%     ASSESSMENT/PLAN:    Coronary artery disease  - 1/2011 s/p STEMI (anterolateral MI) with ventricular fibrillation s/p defibrillation. [facility] - Occluded LAD revascularized with BRANDI. LVEF 20%  - 10/2011 [facility] - patent LAD stent, LVEF 55%  -  2/2012 ECHO --  EF 55%  -  12/2012 and 2/22/19 Myoview GXT -- normal perfusion, normal EF  -  3/12/21 Echo -- EF 55%, no regional wall motion abnormalities  -  tx with ASA,  Coreg, Ramipril, statin. No longer on Ranexa or Imdur. Plan: Stable. Change carvedilol to Toprol-XL starting at 100 mg daily. Hypertension :  -  tx with Coreg, Ramipril  - 2/21/22 -- K+ 5.0, BUN- 18, Creat 1.24   -  Blood pressure (!) 144/90, pulse 87, height 5' 11\" (1.803 m), weight 161 lb 11.2 oz (73.3 kg), SpO2 97 %.     Plan : Suboptimal.  Did not take this morning's carvedilol dose. Change carvedilol to Toprol-XL starting at 100 mg daily and adjusting as necessary. Hyperlipidemia  - 1/25/21 -- TC- 187, TG- 135, HDL- 41, LDL- 122. Liver enzymes normal (admitted to dietary indescretion and was not taking statin consistently)  - 8/5/21 -- TC- 145, TG- 76, HDL- 42, LDL- 88  - 2/21/22 -- TC- 139, TG- 67, HDL- 41, LDL- 84. Liver enzymes normal  - Atorvastatin 20 mg daily    Plan: Now on Crestor. Would like to try to intensify therapy by increasing to 40 mg daily. Fasting lipid profile, LFTs, CK in 2 months. Cardiac murmur  - Echo in 12/2017 showed mild MR and sclerotic aortic valve  - 3/8/21 Echo --normal systolic function, mild mitral regurgitation, aortic valve sclerotic    Plan: Stable. Continue current medical regimen. Plan:  1. Increase Crestor to 40 mg daily. 2.  Fasting lipid profile, LFTs, CK in 2 months. 3.  Discontinue Coreg and start Toprol- mg daily and adjust as necessary. 4.  RTC in 1 year    Scribe's attestation: This note was scribed in the presence of Oumar Adams M.D. by Efrem Skinner RN    Physician Attestation: The scribe's documentation has been prepared under my direction and personally reviewed by me in its entirety. I confirm that the note above accurately reflects all work, treatment, procedures, and medical decision making performed by me. An  electronic signature was used to authenticate this note. Chad Isidro MD, [facility] [place], [address]

## 2022-03-02 ENCOUNTER — OFFICE VISIT (OUTPATIENT)
Dept: CARDIOLOGY CLINIC | Age: 67
End: 2022-03-02
Payer: MEDICARE

## 2022-03-02 VITALS
DIASTOLIC BLOOD PRESSURE: 90 MMHG | SYSTOLIC BLOOD PRESSURE: 144 MMHG | BODY MASS INDEX: 22.64 KG/M2 | OXYGEN SATURATION: 97 % | WEIGHT: 161.7 LBS | HEIGHT: 71 IN | HEART RATE: 87 BPM

## 2022-03-02 DIAGNOSIS — I10 ESSENTIAL HYPERTENSION: ICD-10-CM

## 2022-03-02 DIAGNOSIS — R01.1 CARDIAC MURMUR: ICD-10-CM

## 2022-03-02 DIAGNOSIS — E78.5 HYPERLIPIDEMIA, UNSPECIFIED HYPERLIPIDEMIA TYPE: ICD-10-CM

## 2022-03-02 DIAGNOSIS — I25.10 CORONARY ARTERY DISEASE DUE TO LIPID RICH PLAQUE: Primary | ICD-10-CM

## 2022-03-02 DIAGNOSIS — I25.83 CORONARY ARTERY DISEASE DUE TO LIPID RICH PLAQUE: Primary | ICD-10-CM

## 2022-03-02 PROCEDURE — 99214 OFFICE O/P EST MOD 30 MIN: CPT | Performed by: INTERNAL MEDICINE

## 2022-03-02 PROCEDURE — 1123F ACP DISCUSS/DSCN MKR DOCD: CPT | Performed by: INTERNAL MEDICINE

## 2022-03-02 PROCEDURE — G8427 DOCREV CUR MEDS BY ELIG CLIN: HCPCS | Performed by: INTERNAL MEDICINE

## 2022-03-02 PROCEDURE — 3017F COLORECTAL CA SCREEN DOC REV: CPT | Performed by: INTERNAL MEDICINE

## 2022-03-02 PROCEDURE — 93000 ELECTROCARDIOGRAM COMPLETE: CPT | Performed by: INTERNAL MEDICINE

## 2022-03-02 PROCEDURE — G8484 FLU IMMUNIZE NO ADMIN: HCPCS | Performed by: INTERNAL MEDICINE

## 2022-03-02 PROCEDURE — G8420 CALC BMI NORM PARAMETERS: HCPCS | Performed by: INTERNAL MEDICINE

## 2022-03-02 PROCEDURE — 4040F PNEUMOC VAC/ADMIN/RCVD: CPT | Performed by: INTERNAL MEDICINE

## 2022-03-02 PROCEDURE — 1036F TOBACCO NON-USER: CPT | Performed by: INTERNAL MEDICINE

## 2022-03-02 RX ORDER — METOPROLOL SUCCINATE 100 MG/1
100 TABLET, EXTENDED RELEASE ORAL DAILY
Qty: 90 TABLET | Refills: 1 | Status: SHIPPED | OUTPATIENT
Start: 2022-03-02

## 2022-03-02 RX ORDER — ROSUVASTATIN CALCIUM 40 MG/1
40 TABLET, COATED ORAL DAILY
Qty: 90 TABLET | Refills: 1 | Status: SHIPPED | OUTPATIENT
Start: 2022-03-02

## 2022-03-02 RX ORDER — ROSUVASTATIN CALCIUM 20 MG/1
TABLET, COATED ORAL
COMMUNITY
Start: 2022-02-21 | End: 2022-03-02

## 2022-03-02 NOTE — PATIENT INSTRUCTIONS
1.  Stop Carvedilol  2. Start Metoprolol  mg daily  3. Monitor heart rate and blood pressure at least once a day and write down. Want systolic BP (top #) under 140, but prefer 130 and lower. Want diastolic BP (bottom #) under 90 but prefer 80 and lower   4. Call our office in two weeks or send [employer] message in two weeks with update on BP and heart rate  5. Increase Crestor /Rosuvastatin to 40 mg daily  6. Recheck fasting lipids, AST, ALT, and CK in two months (early May 2022)  7. Call our office with any concerning cardiac symptoms  8.   Follow up with Dr. Mayra Cobb in one year

## 2023-03-17 NOTE — PATIENT INSTRUCTIONS
Goal for LDL is <70.  If we cannot get it down, we can add Zetia first, if that does not work we will look into Repatha (injectable every 2 weeks)    Return to office in 1 year - call the office for any concerns

## 2023-03-17 NOTE — PROGRESS NOTES
direction and personally reviewed by me in its entirety. I confirm that the note above accurately reflects all work, treatment, procedures, and medical decision making performed by me. An  electronic signature was used to authenticate this note. Alvaro Asencio MD, [facility] [place], [address]

## 2023-03-22 ENCOUNTER — OFFICE VISIT (OUTPATIENT)
Dept: CARDIOLOGY CLINIC | Age: 68
End: 2023-03-22
Payer: MEDICARE

## 2023-03-22 VITALS
HEART RATE: 82 BPM | WEIGHT: 158 LBS | SYSTOLIC BLOOD PRESSURE: 120 MMHG | OXYGEN SATURATION: 97 % | DIASTOLIC BLOOD PRESSURE: 78 MMHG | HEIGHT: 70 IN | BODY MASS INDEX: 22.62 KG/M2

## 2023-03-22 DIAGNOSIS — I25.10 CORONARY ARTERY DISEASE DUE TO LIPID RICH PLAQUE: Primary | ICD-10-CM

## 2023-03-22 DIAGNOSIS — I10 ESSENTIAL HYPERTENSION: ICD-10-CM

## 2023-03-22 DIAGNOSIS — R01.1 CARDIAC MURMUR: ICD-10-CM

## 2023-03-22 DIAGNOSIS — I25.83 CORONARY ARTERY DISEASE DUE TO LIPID RICH PLAQUE: Primary | ICD-10-CM

## 2023-03-22 DIAGNOSIS — I25.5 CARDIOMYOPATHY, ISCHEMIC: Chronic | ICD-10-CM

## 2023-03-22 DIAGNOSIS — I49.01 VENTRICULAR FIBRILLATION (HCC): ICD-10-CM

## 2023-03-22 DIAGNOSIS — E78.5 HYPERLIPIDEMIA, UNSPECIFIED HYPERLIPIDEMIA TYPE: ICD-10-CM

## 2023-03-22 PROCEDURE — 1123F ACP DISCUSS/DSCN MKR DOCD: CPT | Performed by: INTERNAL MEDICINE

## 2023-03-22 PROCEDURE — 3017F COLORECTAL CA SCREEN DOC REV: CPT | Performed by: INTERNAL MEDICINE

## 2023-03-22 PROCEDURE — G8427 DOCREV CUR MEDS BY ELIG CLIN: HCPCS | Performed by: INTERNAL MEDICINE

## 2023-03-22 PROCEDURE — 3078F DIAST BP <80 MM HG: CPT | Performed by: INTERNAL MEDICINE

## 2023-03-22 PROCEDURE — 3074F SYST BP LT 130 MM HG: CPT | Performed by: INTERNAL MEDICINE

## 2023-03-22 PROCEDURE — G8484 FLU IMMUNIZE NO ADMIN: HCPCS | Performed by: INTERNAL MEDICINE

## 2023-03-22 PROCEDURE — 1036F TOBACCO NON-USER: CPT | Performed by: INTERNAL MEDICINE

## 2023-03-22 PROCEDURE — G8420 CALC BMI NORM PARAMETERS: HCPCS | Performed by: INTERNAL MEDICINE

## 2023-03-22 PROCEDURE — 93000 ELECTROCARDIOGRAM COMPLETE: CPT | Performed by: INTERNAL MEDICINE

## 2023-03-22 PROCEDURE — 99213 OFFICE O/P EST LOW 20 MIN: CPT | Performed by: INTERNAL MEDICINE

## 2024-03-20 NOTE — PROGRESS NOTES
[facility]     3/22/2024    Kobe Selby (:  1955) is a 68 y.o. male is here for follow-up of management of CAD and modifiable risk factors.    Referring Provider: Wilmer Pavon MD    HISTORY:  Mr. Kobe Selby has a history of coronary artery disease, MI complicated by Vfib, CHF with cardiogenic shock '11, hypertension, hyperlipidemia, and ischemic cardiomyopathy with recovered LVEF.  Additional history includes CKD.      Today, he is doing well. He denies exertional chest pain/pressure, dyspnea at rest, worsening BALL, PND, orthopnea, lightheadedness, weight changes, changes in LE edema, and syncope. his creatinine levels have slowly increased, has not seen a nephrologist. Feels a little tired, no other complaints.       REVIEW OF SYSTEMS:  A complete review of systems was reviewed and is negative except as noted in the history of present illness.    Prior to Visit Medications    Medication Sig Taking? Authorizing Provider   nitroGLYCERIN (NITROSTAT) 0.4 MG SL tablet Place 1 tablet under the tongue every 5 minutes as needed for Chest pain Yes Ronald Diehl MD   ezetimibe (ZETIA) 10 MG tablet Take 1 tablet by mouth daily Yes Ronald Diehl MD   rosuvastatin (CRESTOR) 40 MG tablet Take 1 tablet by mouth daily Yes Ronald Diehl MD   DULoxetine (CYMBALTA) 30 MG extended release capsule Take 1 capsule by mouth daily Yes Dennise Morfin MD   ramipril (ALTACE) 10 MG capsule TAKE 1 CAPSULE DAILY Yes Ronald Diehl MD   aspirin 81 MG tablet Take 1 tablet by mouth daily Yes ProviderDennise MD        Allergies   Allergen Reactions    Azithromycin Hives    Erythromycin Hives    Penicillins Hives    Vancomycin Hives    Tetanus Toxoids     Prednisone Rash       Past Medical History:   Diagnosis Date    Acute MI (HCC) 1/10/11    V Fib cardiac arrest; stent to LAD    CAD (coronary artery disease)     Hypercholesteremia     Hypertension     PCI (pneumatosis cystoides intestinalis)

## 2024-03-22 ENCOUNTER — OFFICE VISIT (OUTPATIENT)
Dept: CARDIOLOGY CLINIC | Age: 69
End: 2024-03-22

## 2024-03-22 VITALS
BODY MASS INDEX: 23.11 KG/M2 | OXYGEN SATURATION: 98 % | HEIGHT: 70 IN | SYSTOLIC BLOOD PRESSURE: 130 MMHG | WEIGHT: 161.4 LBS | HEART RATE: 74 BPM | DIASTOLIC BLOOD PRESSURE: 70 MMHG

## 2024-03-22 DIAGNOSIS — I25.5 CARDIOMYOPATHY, ISCHEMIC: Chronic | ICD-10-CM

## 2024-03-22 DIAGNOSIS — I10 ESSENTIAL HYPERTENSION: ICD-10-CM

## 2024-03-22 DIAGNOSIS — I25.10 CORONARY ARTERY DISEASE DUE TO LIPID RICH PLAQUE: Primary | ICD-10-CM

## 2024-03-22 DIAGNOSIS — R01.1 CARDIAC MURMUR: ICD-10-CM

## 2024-03-22 DIAGNOSIS — N18.31 STAGE 3A CHRONIC KIDNEY DISEASE (HCC): ICD-10-CM

## 2024-03-22 DIAGNOSIS — R01.1 MURMUR: ICD-10-CM

## 2024-03-22 DIAGNOSIS — I25.83 CORONARY ARTERY DISEASE DUE TO LIPID RICH PLAQUE: Primary | ICD-10-CM

## 2024-03-22 DIAGNOSIS — E78.5 HYPERLIPIDEMIA, UNSPECIFIED HYPERLIPIDEMIA TYPE: ICD-10-CM

## 2024-03-22 RX ORDER — NITROGLYCERIN 0.4 MG/1
0.4 TABLET SUBLINGUAL EVERY 5 MIN PRN
Qty: 25 TABLET | Refills: 1 | Status: SHIPPED | OUTPATIENT
Start: 2024-03-22

## 2024-03-22 RX ORDER — EZETIMIBE 10 MG/1
10 TABLET ORAL DAILY
Qty: 90 TABLET | Refills: 3 | Status: SHIPPED | OUTPATIENT
Start: 2024-03-22

## 2024-03-22 NOTE — PATIENT INSTRUCTIONS
Recommend seeing nephrology for your kidney disease   Start Zetia 10 mg to help get your cholesterol LDL down below 70 (current 76)  Have CMP blood work and urinalysis done in the next week or 2.     Re check fasting labs for cholesterol in 2 months

## 2024-03-27 ENCOUNTER — TELEPHONE (OUTPATIENT)
Dept: CARDIOLOGY CLINIC | Age: 69
End: 2024-03-27

## 2024-03-27 NOTE — TELEPHONE ENCOUNTER
April called to inform [facility] that the Pt Potassium is high 5.5.  April did not see in the note if [facility] is treating it.  April wants to know if they should treat the Pt Potassium.  Please advise.  Thank you

## 2024-03-31 NOTE — TELEPHONE ENCOUNTER
Labs done through [facility] and had not been forwarded to [facility]. He was out last week and not aware of the results.

## 2024-04-01 ENCOUNTER — HOSPITAL ENCOUNTER (OUTPATIENT)
Age: 69
Discharge: HOME | End: 2024-04-01
Payer: MEDICARE

## 2024-04-01 DIAGNOSIS — I25.10 CORONARY ARTERY DISEASE DUE TO LIPID RICH PLAQUE: ICD-10-CM

## 2024-04-01 DIAGNOSIS — I25.10 CORONARY ARTERY DISEASE DUE TO LIPID RICH PLAQUE: Primary | ICD-10-CM

## 2024-04-01 DIAGNOSIS — I25.83 CORONARY ARTERY DISEASE DUE TO LIPID RICH PLAQUE: Primary | ICD-10-CM

## 2024-04-01 DIAGNOSIS — I25.83 CORONARY ARTERY DISEASE DUE TO LIPID RICH PLAQUE: ICD-10-CM

## 2024-04-01 LAB
ALBUMIN SERPL-MCNC: 4.1 G/DL (ref 3.4–5)
ALBUMIN/GLOB SERPL: 1.6 {RATIO} (ref 1.1–2.2)
ALP SERPL-CCNC: 75 U/L (ref 40–129)
ALT SERPL-CCNC: 8 U/L (ref 10–40)
ANION GAP SERPL CALCULATED.3IONS-SCNC: 11 MMOL/L (ref 3–16)
AST SERPL-CCNC: 14 U/L (ref 15–37)
BILIRUB SERPL-MCNC: 0.3 MG/DL (ref 0–1)
BUN SERPL-MCNC: 17 MG/DL (ref 7–20)
CALCIUM SERPL-MCNC: 9 MG/DL (ref 8.3–10.6)
CHLORIDE SERPL-SCNC: 98 MMOL/L (ref 99–110)
CO2 SERPL-SCNC: 24 MMOL/L (ref 21–32)
CREAT SERPL-MCNC: 1.3 MG/DL (ref 0.8–1.3)
GFR SERPLBLD CREATININE-BSD FMLA CKD-EPI: 60 ML/MIN/{1.73_M2}
GLUCOSE SERPL-MCNC: 76 MG/DL (ref 70–99)
POTASSIUM SERPL-SCNC: 4.1 MMOL/L (ref 3.5–5.1)
PROT SERPL-MCNC: 6.7 G/DL (ref 6.4–8.2)
SODIUM SERPL-SCNC: 133 MMOL/L (ref 136–145)

## 2024-04-01 PROCEDURE — 80053 COMPREHEN METABOLIC PANEL: CPT

## 2024-04-01 PROCEDURE — 36415 COLL VENOUS BLD VENIPUNCTURE: CPT

## 2024-04-01 NOTE — TELEPHONE ENCOUNTER
Spoke with Nani at nephrology office to verify that the potassium level has not already been addressed, pt is scheduled to see Dr Quiroga as a new patient next week. Discussed with [facility], pt should repeat the lab work today/tomorrow to see if potassium levels are better. Will place orders. Please call patient to let him know to repeat the lab work.   Thank you

## 2024-05-10 ENCOUNTER — HOSPITAL ENCOUNTER (OUTPATIENT)
Dept: ULTRASOUND IMAGING | Age: 69
Discharge: HOME | End: 2024-05-10
Attending: INTERNAL MEDICINE
Payer: MEDICARE

## 2024-05-10 DIAGNOSIS — I10 ESSENTIAL HYPERTENSION: ICD-10-CM

## 2024-05-10 DIAGNOSIS — N18.31 BENIGN HYPERTENSION WITH STAGE 3A CHRONIC KIDNEY DISEASE (HCC): ICD-10-CM

## 2024-05-10 DIAGNOSIS — R82.998 FOAMY URINE: ICD-10-CM

## 2024-05-10 DIAGNOSIS — N18.31 CHRONIC KIDNEY DISEASE, STAGE 3A (HCC): ICD-10-CM

## 2024-05-10 DIAGNOSIS — E87.1 HYPONATREMIA: ICD-10-CM

## 2024-05-10 DIAGNOSIS — E87.5 HYPERKALEMIA: ICD-10-CM

## 2024-05-10 DIAGNOSIS — I12.9 BENIGN HYPERTENSION WITH STAGE 3A CHRONIC KIDNEY DISEASE (HCC): ICD-10-CM

## 2024-05-10 PROCEDURE — 76770 US EXAM ABDO BACK WALL COMP: CPT

## 2024-06-17 ENCOUNTER — HOSPITAL ENCOUNTER (OUTPATIENT)
Age: 69
Discharge: HOME | End: 2024-06-17
Payer: MEDICARE

## 2024-06-17 DIAGNOSIS — E87.5 HYPERKALEMIA: ICD-10-CM

## 2024-06-17 DIAGNOSIS — E87.1 HYPONATREMIA: ICD-10-CM

## 2024-06-17 DIAGNOSIS — N18.31 BENIGN HYPERTENSION WITH STAGE 3A CHRONIC KIDNEY DISEASE (HCC): ICD-10-CM

## 2024-06-17 DIAGNOSIS — R82.998 FOAMY URINE: ICD-10-CM

## 2024-06-17 DIAGNOSIS — N18.31 CHRONIC KIDNEY DISEASE, STAGE 3A (HCC): ICD-10-CM

## 2024-06-17 DIAGNOSIS — I10 ESSENTIAL HYPERTENSION: ICD-10-CM

## 2024-06-17 DIAGNOSIS — I12.9 BENIGN HYPERTENSION WITH STAGE 3A CHRONIC KIDNEY DISEASE (HCC): ICD-10-CM

## 2024-06-17 LAB
25(OH)D3 SERPL-MCNC: 35 NG/ML
ALBUMIN SERPL-MCNC: 4.2 G/DL (ref 3.4–5)
ANION GAP SERPL CALCULATED.3IONS-SCNC: 12 MMOL/L (ref 3–16)
BACTERIA URNS QL MICRO: NORMAL /HPF
BILIRUB UR QL STRIP.AUTO: NEGATIVE
BUN SERPL-MCNC: 17 MG/DL (ref 7–20)
CALCIUM SERPL-MCNC: 9.2 MG/DL (ref 8.3–10.6)
CHLORIDE SERPL-SCNC: 99 MMOL/L (ref 99–110)
CK SERPL-CCNC: 106 U/L (ref 39–308)
CLARITY UR: CLEAR
CO2 SERPL-SCNC: 20 MMOL/L (ref 21–32)
COLOR UR: YELLOW
CREAT SERPL-MCNC: 1.1 MG/DL (ref 0.8–1.3)
CREAT UR-MCNC: 98.1 MG/DL (ref 39–259)
DEPRECATED RDW RBC AUTO: 14 % (ref 12.4–15.4)
EPI CELLS #/AREA URNS AUTO: 0 /HPF (ref 0–5)
GFR SERPLBLD CREATININE-BSD FMLA CKD-EPI: 73 ML/MIN/{1.73_M2}
GLUCOSE SERPL-MCNC: 108 MG/DL (ref 70–99)
GLUCOSE UR STRIP.AUTO-MCNC: NEGATIVE MG/DL
HCT VFR BLD AUTO: 42.7 % (ref 40.5–52.5)
HGB BLD-MCNC: 14.5 G/DL (ref 13.5–17.5)
HGB UR QL STRIP.AUTO: NEGATIVE
HYALINE CASTS #/AREA URNS AUTO: 0 /LPF (ref 0–8)
KETONES UR STRIP.AUTO-MCNC: NEGATIVE MG/DL
LEUKOCYTE ESTERASE UR QL STRIP.AUTO: NEGATIVE
MCH RBC QN AUTO: 31 PG (ref 26–34)
MCHC RBC AUTO-ENTMCNC: 34 G/DL (ref 31–36)
MCV RBC AUTO: 91.2 FL (ref 80–100)
MICROALBUMIN UR DL<=1MG/L-MCNC: <1.2 MG/DL
MICROALBUMIN/CREAT UR: NORMAL MG/G (ref 0–30)
NITRITE UR QL STRIP.AUTO: NEGATIVE
PH UR STRIP.AUTO: 5.5 [PH] (ref 5–8)
PHOSPHATE SERPL-MCNC: 3.7 MG/DL (ref 2.5–4.9)
PLATELET # BLD AUTO: 287 K/UL (ref 135–450)
PMV BLD AUTO: 8.6 FL (ref 5–10.5)
POTASSIUM SERPL-SCNC: 4.9 MMOL/L (ref 3.5–5.1)
PROT UR STRIP.AUTO-MCNC: NEGATIVE MG/DL
PROT UR-MCNC: 0.01 G/DL
PROT UR-MCNC: 9 MG/DL
PROT/CREAT UR-RTO: 0.1 MG/DL
PTH-INTACT SERPL-MCNC: 40.6 PG/ML (ref 14–72)
RBC # BLD AUTO: 4.68 M/UL (ref 4.2–5.9)
RBC CLUMPS #/AREA URNS AUTO: 1 /HPF (ref 0–4)
SODIUM SERPL-SCNC: 131 MMOL/L (ref 136–145)
SP GR UR STRIP.AUTO: 1.01 (ref 1–1.03)
UA DIPSTICK W REFLEX MICRO PNL UR: NORMAL
URN SPEC COLLECT METH UR: NORMAL
UROBILINOGEN UR STRIP-ACNC: 0.2 E.U./DL
WBC # BLD AUTO: 7.9 K/UL (ref 4–11)
WBC #/AREA URNS AUTO: 0 /HPF (ref 0–5)

## 2024-06-17 PROCEDURE — 82570 ASSAY OF URINE CREATININE: CPT

## 2024-06-17 PROCEDURE — 82306 VITAMIN D 25 HYDROXY: CPT

## 2024-06-17 PROCEDURE — 84155 ASSAY OF PROTEIN SERUM: CPT

## 2024-06-17 PROCEDURE — 82043 UR ALBUMIN QUANTITATIVE: CPT

## 2024-06-17 PROCEDURE — 84166 PROTEIN E-PHORESIS/URINE/CSF: CPT

## 2024-06-17 PROCEDURE — 84165 PROTEIN E-PHORESIS SERUM: CPT

## 2024-06-17 PROCEDURE — 36415 COLL VENOUS BLD VENIPUNCTURE: CPT

## 2024-06-17 PROCEDURE — 81001 URINALYSIS AUTO W/SCOPE: CPT

## 2024-06-17 PROCEDURE — 85027 COMPLETE CBC AUTOMATED: CPT

## 2024-06-17 PROCEDURE — 83521 IG LIGHT CHAINS FREE EACH: CPT

## 2024-06-17 PROCEDURE — 82610 CYSTATIN C: CPT

## 2024-06-17 PROCEDURE — 84156 ASSAY OF PROTEIN URINE: CPT

## 2024-06-17 PROCEDURE — 83970 ASSAY OF PARATHORMONE: CPT

## 2024-06-17 PROCEDURE — 82550 ASSAY OF CK (CPK): CPT

## 2024-06-17 PROCEDURE — 80069 RENAL FUNCTION PANEL: CPT

## 2024-06-18 LAB
CYSTATIN C SERPL-MCNC: 1.7 MG/L (ref 0.5–1.2)
EGFR BY CYSTATIN C: 37 ML/MIN/BSA
KAPPA LC FREE SER-MCNC: 50.31 MG/L (ref 3.3–19.4)
KAPPA LC FREE/LAMBDA FREE SER: 2.66 {RATIO} (ref 0.26–1.65)
LAMBDA LC FREE SERPL-MCNC: 18.94 MG/L (ref 5.71–26.3)
RPT COMMENT: ABNORMAL

## 2024-06-19 LAB
ALBUMIN SERPL ELPH-MCNC: 3.5 G/DL (ref 3.1–4.9)
ALPHA1 GLOB SERPL ELPH-MCNC: 0.3 G/DL (ref 0.2–0.4)
ALPHA2 GLOB SERPL ELPH-MCNC: 1 G/DL (ref 0.4–1.1)
B-GLOBULIN SERPL ELPH-MCNC: 1 G/DL (ref 0.9–1.6)
GAMMA GLOB SERPL ELPH-MCNC: 1 G/DL (ref 0.6–1.8)
PROT SERPL-MCNC: 6.8 G/DL (ref 6.4–8.2)
SPE/IFE INTERPRETATION: NORMAL

## 2024-06-21 LAB — PROT PATTERN UR ELPH-IMP: NORMAL

## 2024-10-01 ENCOUNTER — HOSPITAL ENCOUNTER (OUTPATIENT)
Age: 69
Discharge: HOME | End: 2024-10-01
Payer: MEDICARE

## 2024-10-01 DIAGNOSIS — E87.1 HYPONATREMIA: ICD-10-CM

## 2024-10-01 DIAGNOSIS — N18.31 BENIGN HYPERTENSION WITH STAGE 3A CHRONIC KIDNEY DISEASE (HCC): ICD-10-CM

## 2024-10-01 DIAGNOSIS — I12.9 BENIGN HYPERTENSION WITH STAGE 3A CHRONIC KIDNEY DISEASE (HCC): ICD-10-CM

## 2024-10-01 LAB
ALBUMIN SERPL-MCNC: 3.9 G/DL (ref 3.4–5)
ANION GAP SERPL CALCULATED.3IONS-SCNC: 10 MMOL/L (ref 3–16)
BUN SERPL-MCNC: 15 MG/DL (ref 7–20)
CALCIUM SERPL-MCNC: 8.9 MG/DL (ref 8.3–10.6)
CHLORIDE SERPL-SCNC: 102 MMOL/L (ref 99–110)
CO2 SERPL-SCNC: 25 MMOL/L (ref 21–32)
CREAT SERPL-MCNC: 1.3 MG/DL (ref 0.8–1.3)
CREAT UR-MCNC: 206 MG/DL (ref 39–259)
DEPRECATED RDW RBC AUTO: 14 % (ref 12.4–15.4)
GFR SERPLBLD CREATININE-BSD FMLA CKD-EPI: 60 ML/MIN/{1.73_M2}
GLUCOSE SERPL-MCNC: 101 MG/DL (ref 70–99)
HCT VFR BLD AUTO: 36.2 % (ref 40.5–52.5)
HGB BLD-MCNC: 12.3 G/DL (ref 13.5–17.5)
MCH RBC QN AUTO: 31.1 PG (ref 26–34)
MCHC RBC AUTO-ENTMCNC: 34.1 G/DL (ref 31–36)
MCV RBC AUTO: 91.2 FL (ref 80–100)
OSMOLALITY SERPL: 295 MOSM/KG (ref 280–301)
OSMOLALITY UR: 691 MOSM/KG (ref 390–1070)
PHOSPHATE SERPL-MCNC: 3.3 MG/DL (ref 2.5–4.9)
PLATELET # BLD AUTO: 306 K/UL (ref 135–450)
PMV BLD AUTO: 8.7 FL (ref 5–10.5)
POTASSIUM SERPL-SCNC: 4.6 MMOL/L (ref 3.5–5.1)
PROT UR-MCNC: 20.5 MG/DL
PROT/CREAT UR-RTO: 0.1 MG/DL
RBC # BLD AUTO: 3.97 M/UL (ref 4.2–5.9)
SODIUM SERPL-SCNC: 137 MMOL/L (ref 136–145)
SODIUM UR-SCNC: 149 MMOL/L
TSH SERPL DL<=0.005 MIU/L-ACNC: 1.34 UIU/ML (ref 0.27–4.2)
URATE SERPL-MCNC: 6.9 MG/DL (ref 3.5–7.2)
WBC # BLD AUTO: 8.4 K/UL (ref 4–11)

## 2024-10-01 PROCEDURE — 84300 ASSAY OF URINE SODIUM: CPT

## 2024-10-01 PROCEDURE — 85027 COMPLETE CBC AUTOMATED: CPT

## 2024-10-01 PROCEDURE — 80069 RENAL FUNCTION PANEL: CPT

## 2024-10-01 PROCEDURE — 82570 ASSAY OF URINE CREATININE: CPT

## 2024-10-01 PROCEDURE — 84156 ASSAY OF PROTEIN URINE: CPT

## 2024-10-01 PROCEDURE — 84443 ASSAY THYROID STIM HORMONE: CPT

## 2024-10-01 PROCEDURE — 84550 ASSAY OF BLOOD/URIC ACID: CPT

## 2024-10-01 PROCEDURE — 83935 ASSAY OF URINE OSMOLALITY: CPT

## 2024-10-01 PROCEDURE — 83930 ASSAY OF BLOOD OSMOLALITY: CPT

## 2024-10-01 PROCEDURE — 36415 COLL VENOUS BLD VENIPUNCTURE: CPT

## 2025-03-28 ENCOUNTER — HOSPITAL ENCOUNTER (OUTPATIENT)
Age: 70
Discharge: HOME | End: 2025-03-28
Payer: MEDICARE

## 2025-03-28 DIAGNOSIS — I10 ESSENTIAL HYPERTENSION: ICD-10-CM

## 2025-03-28 DIAGNOSIS — E55.9 VITAMIN D DEFICIENCY: ICD-10-CM

## 2025-03-28 LAB
25(OH)D3 SERPL-MCNC: 39 NG/ML
ALBUMIN SERPL-MCNC: 4.3 G/DL (ref 3.4–5)
ANION GAP SERPL CALCULATED.3IONS-SCNC: 10 MMOL/L (ref 3–16)
BACTERIA URNS QL MICRO: ABNORMAL /HPF
BILIRUB UR QL STRIP.AUTO: NEGATIVE
BUN SERPL-MCNC: 20 MG/DL (ref 7–20)
CALCIUM SERPL-MCNC: 9.3 MG/DL (ref 8.3–10.6)
CHLORIDE SERPL-SCNC: 103 MMOL/L (ref 99–110)
CLARITY UR: CLEAR
CO2 SERPL-SCNC: 23 MMOL/L (ref 21–32)
COLOR UR: YELLOW
CREAT SERPL-MCNC: 1.3 MG/DL (ref 0.8–1.3)
CREAT UR-MCNC: 136 MG/DL (ref 39–259)
DEPRECATED RDW RBC AUTO: 14 % (ref 12.4–15.4)
EPI CELLS #/AREA URNS AUTO: 0 /HPF (ref 0–5)
GFR SERPLBLD CREATININE-BSD FMLA CKD-EPI: 60 ML/MIN/{1.73_M2}
GLUCOSE SERPL-MCNC: 103 MG/DL (ref 70–99)
GLUCOSE UR STRIP.AUTO-MCNC: NEGATIVE MG/DL
HCT VFR BLD AUTO: 41.7 % (ref 40.5–52.5)
HGB BLD-MCNC: 14.3 G/DL (ref 13.5–17.5)
HGB UR QL STRIP.AUTO: NEGATIVE
HYALINE CASTS #/AREA URNS AUTO: 0 /LPF (ref 0–8)
KETONES UR STRIP.AUTO-MCNC: NEGATIVE MG/DL
LEUKOCYTE ESTERASE UR QL STRIP.AUTO: ABNORMAL
MCH RBC QN AUTO: 31.4 PG (ref 26–34)
MCHC RBC AUTO-ENTMCNC: 34.3 G/DL (ref 31–36)
MCV RBC AUTO: 91.4 FL (ref 80–100)
MICROALBUMIN UR DL<=1MG/L-MCNC: <1.2 MG/DL
MICROALBUMIN/CREAT UR: NORMAL MG/G (ref 0–30)
NITRITE UR QL STRIP.AUTO: NEGATIVE
PH UR STRIP.AUTO: 6.5 [PH] (ref 5–8)
PHOSPHATE SERPL-MCNC: 2.8 MG/DL (ref 2.5–4.9)
PLATELET # BLD AUTO: 253 K/UL (ref 135–450)
PMV BLD AUTO: 8.7 FL (ref 5–10.5)
POTASSIUM SERPL-SCNC: 4.6 MMOL/L (ref 3.5–5.1)
PROT UR STRIP.AUTO-MCNC: ABNORMAL MG/DL
PROT UR-MCNC: 23.3 MG/DL
PROT/CREAT UR-RTO: 0.2 MG/DL
PTH-INTACT SERPL-MCNC: 46.3 PG/ML (ref 14–72)
RBC # BLD AUTO: 4.56 M/UL (ref 4.2–5.9)
RBC CLUMPS #/AREA URNS AUTO: 5 /HPF (ref 0–4)
SODIUM SERPL-SCNC: 136 MMOL/L (ref 136–145)
SP GR UR STRIP.AUTO: 1.02 (ref 1–1.03)
UA DIPSTICK W REFLEX MICRO PNL UR: YES
URN SPEC COLLECT METH UR: ABNORMAL
UROBILINOGEN UR STRIP-ACNC: 1 E.U./DL
WBC # BLD AUTO: 7.9 K/UL (ref 4–11)
WBC #/AREA URNS AUTO: 1 /HPF (ref 0–5)

## 2025-03-28 PROCEDURE — 84156 ASSAY OF PROTEIN URINE: CPT

## 2025-03-28 PROCEDURE — 81001 URINALYSIS AUTO W/SCOPE: CPT

## 2025-03-28 PROCEDURE — 82306 VITAMIN D 25 HYDROXY: CPT

## 2025-03-28 PROCEDURE — 82570 ASSAY OF URINE CREATININE: CPT

## 2025-03-28 PROCEDURE — 83970 ASSAY OF PARATHORMONE: CPT

## 2025-03-28 PROCEDURE — 80069 RENAL FUNCTION PANEL: CPT

## 2025-03-28 PROCEDURE — 82043 UR ALBUMIN QUANTITATIVE: CPT

## 2025-03-28 PROCEDURE — 85027 COMPLETE CBC AUTOMATED: CPT

## 2025-03-28 PROCEDURE — 36415 COLL VENOUS BLD VENIPUNCTURE: CPT

## 2025-04-06 NOTE — PROGRESS NOTES
[facility]     Outpatient Follow Up Note    CHIEF COMPLAINT / HPI: Follow Up secondary to Coronary artery disease, Hypertension, and Hyperlipidemia       Kobe Selby is 69 y.o. male who presents today for a routine follow up related to the above mentioned issues.  Subjective:   Since the time of last office visit, the patient admits their symptoms have not changed.   Mr. Kobe Selby has a history of coronary artery disease, MI complicated by Vfib, CHF with cardiogenic shock ', hypertension, hyperlipidemia, and ischemic cardiomyopathy with recovered LVEF. Additional history includes CKD.     At today's office visit patient denies any chest pain, palpitations, SOB, dyspnea, dizziness, or edema. Patient is a retired  with [employer].   With regard to medication therapy the patient has been compliant with prescribed regimen. They have tolerated therapy to date.     Past Medical History:   Diagnosis Date    Acute MI (HCC) 1/10/11    V Fib cardiac arrest; stent to LAD    CAD (coronary artery disease)     Hypercholesteremia     Hypertension     PCI (pneumatosis cystoides intestinalis)     Psychiatric problem      Social History:    Social History     Tobacco Use   Smoking Status Former    Current packs/day: 0.00    Average packs/day: 0.5 packs/day for 30.0 years (15.0 ttl pk-yrs)    Types: Cigarettes    Start date: 1981    Quit date: 2011    Years since quittin.2   Smokeless Tobacco Never   Tobacco Comments    H.O.smoking 0.5 p.p.d x 30 yrs / Quit 2011     Current Medications:  Current Outpatient Medications   Medication Sig Dispense Refill    tamsulosin (FLOMAX) 0.4 MG capsule Take 1 capsule by mouth daily      nitroGLYCERIN (NITROSTAT) 0.4 MG SL tablet Place 1 tablet under the tongue every 5 minutes as needed for Chest pain 25 tablet 1    ezetimibe (ZETIA) 10 MG tablet Take 1 tablet by mouth daily 90 tablet 3    rosuvastatin (CRESTOR) 40 MG tablet Take 1 tablet by mouth

## 2025-04-07 ENCOUNTER — OFFICE VISIT (OUTPATIENT)
Dept: CARDIOLOGY CLINIC | Age: 70
End: 2025-04-07
Payer: MEDICARE

## 2025-04-07 VITALS
OXYGEN SATURATION: 97 % | DIASTOLIC BLOOD PRESSURE: 80 MMHG | HEIGHT: 71 IN | BODY MASS INDEX: 22.46 KG/M2 | HEART RATE: 80 BPM | WEIGHT: 160.4 LBS | SYSTOLIC BLOOD PRESSURE: 132 MMHG

## 2025-04-07 DIAGNOSIS — I25.83 CORONARY ARTERY DISEASE DUE TO LIPID RICH PLAQUE: Primary | ICD-10-CM

## 2025-04-07 DIAGNOSIS — E78.5 HYPERLIPIDEMIA, UNSPECIFIED HYPERLIPIDEMIA TYPE: ICD-10-CM

## 2025-04-07 DIAGNOSIS — I25.10 CORONARY ARTERY DISEASE DUE TO LIPID RICH PLAQUE: Primary | ICD-10-CM

## 2025-04-07 DIAGNOSIS — I10 ESSENTIAL HYPERTENSION: ICD-10-CM

## 2025-04-07 PROCEDURE — 1036F TOBACCO NON-USER: CPT | Performed by: NURSE PRACTITIONER

## 2025-04-07 PROCEDURE — 99214 OFFICE O/P EST MOD 30 MIN: CPT | Performed by: NURSE PRACTITIONER

## 2025-04-07 PROCEDURE — G8420 CALC BMI NORM PARAMETERS: HCPCS | Performed by: NURSE PRACTITIONER

## 2025-04-07 PROCEDURE — 3017F COLORECTAL CA SCREEN DOC REV: CPT | Performed by: NURSE PRACTITIONER

## 2025-04-07 PROCEDURE — 3079F DIAST BP 80-89 MM HG: CPT | Performed by: NURSE PRACTITIONER

## 2025-04-07 PROCEDURE — 1159F MED LIST DOCD IN RCRD: CPT | Performed by: NURSE PRACTITIONER

## 2025-04-07 PROCEDURE — 93000 ELECTROCARDIOGRAM COMPLETE: CPT | Performed by: NURSE PRACTITIONER

## 2025-04-07 PROCEDURE — 3075F SYST BP GE 130 - 139MM HG: CPT | Performed by: NURSE PRACTITIONER

## 2025-04-07 PROCEDURE — 1160F RVW MEDS BY RX/DR IN RCRD: CPT | Performed by: NURSE PRACTITIONER

## 2025-04-07 PROCEDURE — 1123F ACP DISCUSS/DSCN MKR DOCD: CPT | Performed by: NURSE PRACTITIONER

## 2025-04-07 PROCEDURE — G8427 DOCREV CUR MEDS BY ELIG CLIN: HCPCS | Performed by: NURSE PRACTITIONER

## 2025-04-07 RX ORDER — TAMSULOSIN HYDROCHLORIDE 0.4 MG/1
0.4 CAPSULE ORAL DAILY
COMMUNITY
Start: 2025-01-20 | End: 2025-07-19

## 2025-07-07 ENCOUNTER — HOSPITAL ENCOUNTER (OUTPATIENT)
Age: 70
Discharge: HOME | End: 2025-07-07
Payer: MEDICARE

## 2025-07-07 DIAGNOSIS — R31.29 MICROSCOPIC HEMATURIA: ICD-10-CM

## 2025-07-07 DIAGNOSIS — R35.1 NOCTURIA: ICD-10-CM

## 2025-07-07 DIAGNOSIS — N18.31 STAGE 3A CHRONIC KIDNEY DISEASE (HCC): ICD-10-CM

## 2025-07-07 LAB
ALBUMIN SERPL-MCNC: 4 G/DL (ref 3.4–5)
ANION GAP SERPL CALCULATED.3IONS-SCNC: 12 MMOL/L (ref 3–16)
BACTERIA URNS QL MICRO: NORMAL /HPF
BILIRUB UR QL STRIP.AUTO: NEGATIVE
BUN SERPL-MCNC: 23 MG/DL (ref 7–20)
CALCIUM SERPL-MCNC: 8.7 MG/DL (ref 8.3–10.6)
CHLORIDE SERPL-SCNC: 105 MMOL/L (ref 99–110)
CLARITY UR: ABNORMAL
CO2 SERPL-SCNC: 21 MMOL/L (ref 21–32)
COLOR UR: YELLOW
CREAT SERPL-MCNC: 1.2 MG/DL (ref 0.8–1.3)
CREAT UR-MCNC: 175 MG/DL (ref 39–259)
DEPRECATED RDW RBC AUTO: 13.7 % (ref 12.4–15.4)
EPI CELLS #/AREA URNS AUTO: 0 /HPF (ref 0–5)
GFR SERPLBLD CREATININE-BSD FMLA CKD-EPI: 65 ML/MIN/{1.73_M2}
GLUCOSE SERPL-MCNC: 102 MG/DL (ref 70–99)
GLUCOSE UR STRIP.AUTO-MCNC: NEGATIVE MG/DL
HCT VFR BLD AUTO: 38.4 % (ref 40.5–52.5)
HGB BLD-MCNC: 13.2 G/DL (ref 13.5–17.5)
HGB UR QL STRIP.AUTO: NEGATIVE
HYALINE CASTS #/AREA URNS AUTO: 0 /LPF (ref 0–8)
KETONES UR STRIP.AUTO-MCNC: NEGATIVE MG/DL
LEUKOCYTE ESTERASE UR QL STRIP.AUTO: NEGATIVE
MCH RBC QN AUTO: 31.2 PG (ref 26–34)
MCHC RBC AUTO-ENTMCNC: 34.3 G/DL (ref 31–36)
MCV RBC AUTO: 90.7 FL (ref 80–100)
NITRITE UR QL STRIP.AUTO: NEGATIVE
PH UR STRIP.AUTO: 5 [PH] (ref 5–8)
PHOSPHATE SERPL-MCNC: 3 MG/DL (ref 2.5–4.9)
PLATELET # BLD AUTO: 241 K/UL (ref 135–450)
PMV BLD AUTO: 9 FL (ref 5–10.5)
POTASSIUM SERPL-SCNC: 4.3 MMOL/L (ref 3.5–5.1)
PROT UR STRIP.AUTO-MCNC: 30 MG/DL
PROT UR-MCNC: 39 MG/DL
PROT/CREAT UR-RTO: 0.2 MG/DL
PSA SERPL DL<=0.01 NG/ML-MCNC: 1.86 NG/ML (ref 0–4)
RBC # BLD AUTO: 4.23 M/UL (ref 4.2–5.9)
RBC CLUMPS #/AREA URNS AUTO: 3 /HPF (ref 0–4)
SODIUM SERPL-SCNC: 138 MMOL/L (ref 136–145)
SP GR UR STRIP.AUTO: 1.02 (ref 1–1.03)
UA COMPLETE W REFLEX CULTURE PNL UR: ABNORMAL
UA DIPSTICK W REFLEX MICRO PNL UR: YES
URN SPEC COLLECT METH UR: ABNORMAL
UROBILINOGEN UR STRIP-ACNC: 0.2 E.U./DL
WBC # BLD AUTO: 7.3 K/UL (ref 4–11)
WBC #/AREA URNS AUTO: 1 /HPF (ref 0–5)

## 2025-07-07 PROCEDURE — 82570 ASSAY OF URINE CREATININE: CPT

## 2025-07-07 PROCEDURE — 84153 ASSAY OF PSA TOTAL: CPT

## 2025-07-07 PROCEDURE — 36415 COLL VENOUS BLD VENIPUNCTURE: CPT

## 2025-07-07 PROCEDURE — 81001 URINALYSIS AUTO W/SCOPE: CPT

## 2025-07-07 PROCEDURE — 80069 RENAL FUNCTION PANEL: CPT

## 2025-07-07 PROCEDURE — 85027 COMPLETE CBC AUTOMATED: CPT

## 2025-07-07 PROCEDURE — 84156 ASSAY OF PROTEIN URINE: CPT
